# Patient Record
Sex: FEMALE | Race: WHITE | NOT HISPANIC OR LATINO | Employment: OTHER | URBAN - METROPOLITAN AREA
[De-identification: names, ages, dates, MRNs, and addresses within clinical notes are randomized per-mention and may not be internally consistent; named-entity substitution may affect disease eponyms.]

---

## 2018-05-15 ENCOUNTER — HOSPITAL ENCOUNTER (OUTPATIENT)
Dept: RADIOLOGY | Facility: MEDICAL CENTER | Age: 63
End: 2018-05-15

## 2018-05-15 ENCOUNTER — HOSPITAL ENCOUNTER (INPATIENT)
Facility: MEDICAL CENTER | Age: 63
LOS: 2 days | DRG: 419 | End: 2018-05-18
Attending: EMERGENCY MEDICINE | Admitting: INTERNAL MEDICINE
Payer: COMMERCIAL

## 2018-05-15 DIAGNOSIS — I10 ESSENTIAL HYPERTENSION, BENIGN: ICD-10-CM

## 2018-05-15 DIAGNOSIS — I31.39 PERICARDIAL EFFUSION: ICD-10-CM

## 2018-05-15 DIAGNOSIS — K80.51 CALCULUS OF BILE DUCT WITHOUT CHOLECYSTITIS WITH OBSTRUCTION: ICD-10-CM

## 2018-05-15 PROCEDURE — 99285 EMERGENCY DEPT VISIT HI MDM: CPT

## 2018-05-15 ASSESSMENT — PAIN SCALES - GENERAL: PAINLEVEL_OUTOF10: 4

## 2018-05-16 ENCOUNTER — APPOINTMENT (OUTPATIENT)
Dept: RADIOLOGY | Facility: MEDICAL CENTER | Age: 63
DRG: 419 | End: 2018-05-16
Attending: EMERGENCY MEDICINE
Payer: COMMERCIAL

## 2018-05-16 ENCOUNTER — HOSPITAL ENCOUNTER (OUTPATIENT)
Dept: RADIOLOGY | Facility: MEDICAL CENTER | Age: 63
End: 2018-05-16

## 2018-05-16 PROBLEM — J45.20 MILD INTERMITTENT ASTHMA: Status: ACTIVE | Noted: 2018-05-16

## 2018-05-16 PROBLEM — I10 ESSENTIAL HYPERTENSION: Status: ACTIVE | Noted: 2018-05-16

## 2018-05-16 PROBLEM — K80.51 CALCULUS OF BILE DUCT WITHOUT CHOLECYSTITIS WITH OBSTRUCTION: Status: ACTIVE | Noted: 2018-05-16

## 2018-05-16 LAB
ALBUMIN SERPL BCP-MCNC: 3.9 G/DL (ref 3.2–4.9)
ALBUMIN/GLOB SERPL: 1.3 G/DL
ALP SERPL-CCNC: 119 U/L (ref 30–99)
ALT SERPL-CCNC: 238 U/L (ref 2–50)
ANION GAP SERPL CALC-SCNC: 9 MMOL/L (ref 0–11.9)
AST SERPL-CCNC: 213 U/L (ref 12–45)
BASOPHILS # BLD AUTO: 0.8 % (ref 0–1.8)
BASOPHILS # BLD: 0.06 K/UL (ref 0–0.12)
BILIRUB SERPL-MCNC: 0.6 MG/DL (ref 0.1–1.5)
BUN SERPL-MCNC: 18 MG/DL (ref 8–22)
CALCIUM SERPL-MCNC: 9.5 MG/DL (ref 8.5–10.5)
CHLORIDE SERPL-SCNC: 108 MMOL/L (ref 96–112)
CO2 SERPL-SCNC: 23 MMOL/L (ref 20–33)
CREAT SERPL-MCNC: 0.66 MG/DL (ref 0.5–1.4)
EOSINOPHIL # BLD AUTO: 0.11 K/UL (ref 0–0.51)
EOSINOPHIL NFR BLD: 1.4 % (ref 0–6.9)
ERYTHROCYTE [DISTWIDTH] IN BLOOD BY AUTOMATED COUNT: 45.7 FL (ref 35.9–50)
GLOBULIN SER CALC-MCNC: 3 G/DL (ref 1.9–3.5)
GLUCOSE SERPL-MCNC: 102 MG/DL (ref 65–99)
HCT VFR BLD AUTO: 42 % (ref 37–47)
HGB BLD-MCNC: 13.9 G/DL (ref 12–16)
IMM GRANULOCYTES # BLD AUTO: 0.05 K/UL (ref 0–0.11)
IMM GRANULOCYTES NFR BLD AUTO: 0.6 % (ref 0–0.9)
LIPASE SERPL-CCNC: 23 U/L (ref 11–82)
LYMPHOCYTES # BLD AUTO: 2.14 K/UL (ref 1–4.8)
LYMPHOCYTES NFR BLD: 27.1 % (ref 22–41)
MCH RBC QN AUTO: 30.4 PG (ref 27–33)
MCHC RBC AUTO-ENTMCNC: 33.1 G/DL (ref 33.6–35)
MCV RBC AUTO: 91.9 FL (ref 81.4–97.8)
MONOCYTES # BLD AUTO: 0.52 K/UL (ref 0–0.85)
MONOCYTES NFR BLD AUTO: 6.6 % (ref 0–13.4)
NEUTROPHILS # BLD AUTO: 5.01 K/UL (ref 2–7.15)
NEUTROPHILS NFR BLD: 63.5 % (ref 44–72)
NRBC # BLD AUTO: 0 K/UL
NRBC BLD-RTO: 0 /100 WBC
PLATELET # BLD AUTO: 209 K/UL (ref 164–446)
PMV BLD AUTO: 11 FL (ref 9–12.9)
POTASSIUM SERPL-SCNC: 4 MMOL/L (ref 3.6–5.5)
PROT SERPL-MCNC: 6.9 G/DL (ref 6–8.2)
RBC # BLD AUTO: 4.57 M/UL (ref 4.2–5.4)
SODIUM SERPL-SCNC: 140 MMOL/L (ref 135–145)
WBC # BLD AUTO: 7.9 K/UL (ref 4.8–10.8)

## 2018-05-16 PROCEDURE — 99223 1ST HOSP IP/OBS HIGH 75: CPT | Performed by: INTERNAL MEDICINE

## 2018-05-16 PROCEDURE — 700105 HCHG RX REV CODE 258: Performed by: INTERNAL MEDICINE

## 2018-05-16 PROCEDURE — 85025 COMPLETE CBC W/AUTO DIFF WBC: CPT

## 2018-05-16 PROCEDURE — 74181 MRI ABDOMEN W/O CONTRAST: CPT

## 2018-05-16 PROCEDURE — 770006 HCHG ROOM/CARE - MED/SURG/GYN SEMI*

## 2018-05-16 PROCEDURE — 700111 HCHG RX REV CODE 636 W/ 250 OVERRIDE (IP): Performed by: EMERGENCY MEDICINE

## 2018-05-16 PROCEDURE — 83690 ASSAY OF LIPASE: CPT

## 2018-05-16 PROCEDURE — A9270 NON-COVERED ITEM OR SERVICE: HCPCS | Performed by: INTERNAL MEDICINE

## 2018-05-16 PROCEDURE — 80053 COMPREHEN METABOLIC PANEL: CPT

## 2018-05-16 PROCEDURE — 36415 COLL VENOUS BLD VENIPUNCTURE: CPT

## 2018-05-16 PROCEDURE — 700102 HCHG RX REV CODE 250 W/ 637 OVERRIDE(OP): Performed by: INTERNAL MEDICINE

## 2018-05-16 RX ORDER — PROMETHAZINE HYDROCHLORIDE 25 MG/1
12.5-25 TABLET ORAL EVERY 4 HOURS PRN
Status: DISCONTINUED | OUTPATIENT
Start: 2018-05-16 | End: 2018-05-18 | Stop reason: HOSPADM

## 2018-05-16 RX ORDER — OXYCODONE HYDROCHLORIDE 10 MG/1
10 TABLET ORAL
Status: DISCONTINUED | OUTPATIENT
Start: 2018-05-16 | End: 2018-05-18 | Stop reason: HOSPADM

## 2018-05-16 RX ORDER — LABETALOL HYDROCHLORIDE 5 MG/ML
10 INJECTION, SOLUTION INTRAVENOUS EVERY 4 HOURS PRN
Status: DISCONTINUED | OUTPATIENT
Start: 2018-05-16 | End: 2018-05-18 | Stop reason: HOSPADM

## 2018-05-16 RX ORDER — PROMETHAZINE HYDROCHLORIDE 25 MG/1
12.5-25 SUPPOSITORY RECTAL EVERY 4 HOURS PRN
Status: DISCONTINUED | OUTPATIENT
Start: 2018-05-16 | End: 2018-05-18 | Stop reason: HOSPADM

## 2018-05-16 RX ORDER — ONDANSETRON 2 MG/ML
4 INJECTION INTRAMUSCULAR; INTRAVENOUS EVERY 4 HOURS PRN
Status: DISCONTINUED | OUTPATIENT
Start: 2018-05-16 | End: 2018-05-18 | Stop reason: HOSPADM

## 2018-05-16 RX ORDER — ALBUTEROL SULFATE 90 UG/1
2 AEROSOL, METERED RESPIRATORY (INHALATION)
Status: DISCONTINUED | OUTPATIENT
Start: 2018-05-16 | End: 2018-05-18 | Stop reason: HOSPADM

## 2018-05-16 RX ORDER — OXYCODONE HYDROCHLORIDE 5 MG/1
5 TABLET ORAL
Status: DISCONTINUED | OUTPATIENT
Start: 2018-05-16 | End: 2018-05-18 | Stop reason: HOSPADM

## 2018-05-16 RX ORDER — LOSARTAN POTASSIUM 25 MG/1
25 TABLET ORAL
Status: DISCONTINUED | OUTPATIENT
Start: 2018-05-16 | End: 2018-05-18 | Stop reason: HOSPADM

## 2018-05-16 RX ORDER — ONDANSETRON 4 MG/1
4 TABLET, ORALLY DISINTEGRATING ORAL EVERY 4 HOURS PRN
Status: DISCONTINUED | OUTPATIENT
Start: 2018-05-16 | End: 2018-05-18 | Stop reason: HOSPADM

## 2018-05-16 RX ORDER — NAPROXEN SODIUM 220 MG
220 TABLET ORAL 2 TIMES DAILY PRN
COMMUNITY

## 2018-05-16 RX ORDER — MORPHINE SULFATE 4 MG/ML
4 INJECTION, SOLUTION INTRAMUSCULAR; INTRAVENOUS
Status: DISCONTINUED | OUTPATIENT
Start: 2018-05-16 | End: 2018-05-18 | Stop reason: HOSPADM

## 2018-05-16 RX ORDER — ACETAMINOPHEN 325 MG/1
650 TABLET ORAL EVERY 6 HOURS PRN
Status: DISCONTINUED | OUTPATIENT
Start: 2018-05-16 | End: 2018-05-18 | Stop reason: HOSPADM

## 2018-05-16 RX ORDER — SODIUM CHLORIDE 9 MG/ML
INJECTION, SOLUTION INTRAVENOUS CONTINUOUS
Status: DISCONTINUED | OUTPATIENT
Start: 2018-05-16 | End: 2018-05-18

## 2018-05-16 RX ADMIN — SODIUM CHLORIDE: 9 INJECTION, SOLUTION INTRAVENOUS at 19:35

## 2018-05-16 RX ADMIN — LOSARTAN POTASSIUM 25 MG: 25 TABLET, FILM COATED ORAL at 13:56

## 2018-05-16 RX ADMIN — SODIUM CHLORIDE: 9 INJECTION, SOLUTION INTRAVENOUS at 05:02

## 2018-05-16 RX ADMIN — FENTANYL CITRATE 50 MCG: 50 INJECTION, SOLUTION INTRAMUSCULAR; INTRAVENOUS at 03:52

## 2018-05-16 ASSESSMENT — LIFESTYLE VARIABLES
HAVE PEOPLE ANNOYED YOU BY CRITICIZING YOUR DRINKING: NO
HAVE YOU EVER FELT YOU SHOULD CUT DOWN ON YOUR DRINKING: NO
ALCOHOL_USE: YES
AVERAGE NUMBER OF DAYS PER WEEK YOU HAVE A DRINK CONTAINING ALCOHOL: 7
ON A TYPICAL DAY WHEN YOU DRINK ALCOHOL HOW MANY DRINKS DO YOU HAVE: 1
TOTAL SCORE: 0
HOW MANY TIMES IN THE PAST YEAR HAVE YOU HAD 5 OR MORE DRINKS IN A DAY: 0
EVER FELT BAD OR GUILTY ABOUT YOUR DRINKING: NO
EVER_SMOKED: NEVER
TOTAL SCORE: 0
CONSUMPTION TOTAL: NEGATIVE
TOTAL SCORE: 0
EVER HAD A DRINK FIRST THING IN THE MORNING TO STEADY YOUR NERVES TO GET RID OF A HANGOVER: NO
EVER_SMOKED: NEVER

## 2018-05-16 ASSESSMENT — ENCOUNTER SYMPTOMS
SPUTUM PRODUCTION: 0
VOMITING: 0
BLURRED VISION: 0
MYALGIAS: 0
HEADACHES: 0
NAUSEA: 1
SEIZURES: 0
BLOOD IN STOOL: 0
CHILLS: 0
DIZZINESS: 0
DIARRHEA: 0
BRUISES/BLEEDS EASILY: 0
COUGH: 0
FLANK PAIN: 0
FOCAL WEAKNESS: 0
SHORTNESS OF BREATH: 0
DIAPHORESIS: 0
NECK PAIN: 0
BACK PAIN: 0
ABDOMINAL PAIN: 1
FEVER: 0
SORE THROAT: 0
PALPITATIONS: 0
WHEEZING: 0

## 2018-05-16 ASSESSMENT — PAIN SCALES - GENERAL
PAINLEVEL_OUTOF10: 0
PAINLEVEL_OUTOF10: 0
PAINLEVEL_OUTOF10: 1
PAINLEVEL_OUTOF10: 0
PAINLEVEL_OUTOF10: 0

## 2018-05-16 ASSESSMENT — PATIENT HEALTH QUESTIONNAIRE - PHQ9
SUM OF ALL RESPONSES TO PHQ9 QUESTIONS 1 AND 2: 0
1. LITTLE INTEREST OR PLEASURE IN DOING THINGS: NOT AT ALL
2. FEELING DOWN, DEPRESSED, IRRITABLE, OR HOPELESS: NOT AT ALL

## 2018-05-16 ASSESSMENT — COPD QUESTIONNAIRES
IN THE PAST 12 MONTHS DO YOU DO LESS THAN YOU USED TO BECAUSE OF YOUR BREATHING PROBLEMS: DISAGREE/UNSURE
HAVE YOU SMOKED AT LEAST 100 CIGARETTES IN YOUR ENTIRE LIFE: NO/DON'T KNOW
DO YOU EVER COUGH UP ANY MUCUS OR PHLEGM?: NO/ONLY WITH OCCASIONAL COLDS OR INFECTIONS
DURING THE PAST 4 WEEKS HOW MUCH DID YOU FEEL SHORT OF BREATH: NONE/LITTLE OF THE TIME
DO YOU EVER COUGH UP ANY MUCUS OR PHLEGM?: NO/ONLY WITH OCCASIONAL COLDS OR INFECTIONS
COPD SCREENING SCORE: 2
COPD SCREENING SCORE: 2
HAVE YOU SMOKED AT LEAST 100 CIGARETTES IN YOUR ENTIRE LIFE: NO/DON'T KNOW
DURING THE PAST 4 WEEKS HOW MUCH DID YOU FEEL SHORT OF BREATH: NONE/LITTLE OF THE TIME

## 2018-05-16 NOTE — PROGRESS NOTES
Med rec complete per pt at bedside  Allergies reviewed - NKDA  No ABX in last month  Pt denies taking any prescription medications

## 2018-05-16 NOTE — CARE PLAN
Problem: Communication  Goal: The ability to communicate needs accurately and effectively will improve    Intervention: Educate patient and significant other/support system about the plan of care, procedures, treatments, medications and allow for questions  Pending GI consult, patient to remain NPO for possible surgery, patient and significant other aware with plan of care.       Problem: Pain Management  Goal: Pain level will decrease to patient's comfort goal    Intervention: Educate and implement non-pharmacologic comfort measures. Examples: relaxation, distration, play therapy, activity therapy, massage, etc.  Patient denies any abdominal pain or tenderness upon palpation, bowel sounds normoactive, patient up self and tolerating ambulation well with no complaints.

## 2018-05-16 NOTE — ED NOTES
Pt medicated for pain, O2 applied 2 L NC secondary to med admin. R IV would not draw or flush, L IV infiltrated, both removed, new line inserted.

## 2018-05-16 NOTE — ED PROVIDER NOTES
"ED Provider Note    Scribed for Janny Trevizo M.D. by Karen Hernandez. 5/16/2018, 12:27 AM.    Means of arrival: walk in   History obtained from: Patient  History limited by: none     CHIEF COMPLAINT  Chief Complaint   Patient presents with   • Flank Pain     R side   • Gallstones     transfer from [facility] in [place], [place].        SARIAH Ahmadi is a 63 y.o. female with a history of hypertension, asthma who presents to the Emergency Department via EMS as a transfer form [facility] for evaluation of severe right sided flank pain with associated epigastric components onset today. Patient reports associated nausea. Her pain began in her upper mid back and radiated to her right flank. Patient was given pain medication at the transferring facility and reports her pain has improved at this time. No complaints of vomiting.     Review of transfer medical records from [facility] shows the patient had elevated LFT's, ALT of 369, AST of 447, alkaline phosphate of 167, total bilirubin of 0.9, a normal lipase, US of right upper quadrant indicated 3 mm CBD stones and the CBD is dilated to 9 mm without pericholecystic fluid. Patient was transferred here for a GI consult.     REVIEW OF SYSTEMS  Positives as above. Pertinent negatives include vomiting.   All other review of systems are negative.   C    PAST MEDICAL HISTORY    hypertension, asthma    SURGICAL HISTORY  patient denies any surgical history    SOCIAL HISTORY  Patient is a transfer from [facility]     FAMILY HISTORY  No family history noted    CURRENT MEDICATIONS  Reviewed. See Encounter Summary.     ALLERGIES  No Known Allergies    PHYSICAL EXAM  VITAL SIGNS: /122   Pulse 94   Temp 36.7 °C (98.1 °F) (Temporal)   Resp 16   Ht 1.702 m (5' 7\")   Wt 93.1 kg (205 lb 4 oz)   SpO2 97%   BMI 32.15 kg/m²    Pulse ox interpretation: I interpret this pulse ox as normal.  Constitutional: Alert in no apparent " distress.  HENT: Normocephalic atraumatic, MMM  Eyes: PERR, Conjunctiva normal, Non-icteric.   Neck: Normal range of motion, No tenderness, Supple, No stridor.   Lymphatic: No lymphadenopathy noted.   Cardiovascular: Regular rate and rhythm, no murmurs.   Thorax & Lungs: Normal breath sounds, No respiratory distress, No wheezing, No chest tenderness.   Abdomen: Bowel sounds normal, Soft, No tenderness, No pulsatile masses. No peritoneal signs.  Skin: Warm, Dry, No erythema, No rash.   Back: No bony tenderness, No CVA tenderness.   Extremities: Intact distal pulses, No edema, No tenderness, No cyanosis  Musculoskeletal: Good range of motion in all major joints. No tenderness to palpation or major deformities noted.   Neurologic: Alert and oriented, No focal deficits noted.       DIFFERENTIAL DIAGNOSIS AND WORK UP PLAN    12:27 AM Patient seen and examined at bedside. The patient presents with right sided flank pain and gallstones and the differential diagnosis includes but is not limited to low concern for ACS. Ordered for MR abdomen, CBC, CMP, lipase to evaluate.     DIAGNOSTIC STUDIES / PROCEDURES     LABS  CBC within normal limits, CMP with ALT AST and alk phos elevated with a normal T bili and also improved from prior lipase normal  All labs were reviewed by me.    RADIOLOGY  [id number]   Final Result      1.  No gallstone demonstrated.   2.  No biliary dilation or evidence for common bile duct stone.      OUTSIDE IMAGES-US ABDOMEN   Final Result      OUTSIDE IMAGES-CT ABDOMEN /PELVIS   Final Result        The radiologist's interpretation of all radiological studies have been reviewed by me.    COURSE & MEDICAL DECISION MAKING  Pertinent Labs & Imaging studies reviewed. (See chart for details)    1:16 AM- Discussed case with Dr. Santana (GI) who will follow the patient agrees with my plan of care.  Dr. Santana requested that all non-emergent consults should be called in the morning and not in the middle of the  night, especially if there are no questions.     1:31 AM D/W with Dr. Hodge (hospitalist) regarding the patient.  He accepts the patient for admission.      3:49 AM- 3:49 AM - I spoke with the nurse who informed me the patient continues to experience pain and is requesting more pain medication. She will be given Sublimaze 50 mcg.     This is a 63 y.o. year old female who presents with concern for CBD dilatation and stone stuck in the ducts without an elevated T bili, will perform MRCP for GI and that the patient in the hospital      DISPOSITION:  Patient will be admitted to Dr. Hodge (hospitalist) in guarded condition.    FINAL IMPRESSION  1. Symptomatic cholelithiasis  2. Elevated lft's  3. Epigastric pain  4. Nausea and vomiting, not intractable      IKaren (Scribe), am scribing for, and in the presence of, Janny Trevizo M.D..    Electronically signed by: Karen Hernandez (Scribe), 5/16/2018    IJanny M.D. personally performed the services described in this documentation, as scribed by Karen Hernandez in my presence, and it is both accurate and complete.    The note accurately reflects work and decisions made by me.  Janny Trevizo  5/16/2018  5:06 AM    This dictation has been created using voice recognition software and/or scribes. The accuracy of the dictation is limited by the abilities of the software and the expertise of the scribes. I expect there may be some errors of grammar and possibly content. I made every attempt to manually correct the errors within my dictation. However, errors related to voice recognition software and/or scribes may still exist and should be interpreted within the appropriate context.

## 2018-05-16 NOTE — ASSESSMENT & PLAN NOTE
MRCP negative for gallstone  Status post laparoscopic cholecystectomy secondary to acute cholecystitis, postop day #0    GI , no indication for ERCP secondary to a negative MRCP    No evidence of infection at this time  Continue to monitor CMP  Pain control with oxycodone and IV morphine

## 2018-05-16 NOTE — PROGRESS NOTES
Admitted this a.m. from [facility] with common bile duct stone and 9 mm CBD dilatation. GI has been consulted. Follow-up recommendations.  MRCP is negative for stone or CBD dilatation. Continue IV fluid and pain management.

## 2018-05-16 NOTE — ASSESSMENT & PLAN NOTE
controlled  I will start the patient on losartan 25 mg daily  IV labetalol when necessary for SBP greater than 160

## 2018-05-16 NOTE — PROGRESS NOTES
Pt arrived to unit at 0530 via Pt transport with  at the bedside. Oriented to unit, routine, and use of call light system.

## 2018-05-16 NOTE — H&P
Hospital Medicine History and Physical    Date of Service  5/16/2018    Chief Complaint  Chief Complaint   Patient presents with   • Flank Pain     R side   • Gallstones     transfer from [facility] in [place], [place].        History of Presenting Illness  63 y.o. female who presented 5/15/2018 with right sided abdominal pain started earlier today. The patient reports right upper quadrant abdominal pain with radiation to the back, associated with nausea but no vomiting. Pain is rated at 8/10 in intensity. There are no relieving or exacerbating factors. She denies any fevers, chest pain, shortness of breath, diarrhea or dysuria. She presented to [facility] facility where she was found to have elevated LFTs with ALT of 369, , alk phos 167, total bili 0.9, normal lipase. Ultrasound abdomen revealed 3 mm CBD stone and CBD dilatation of 9 mm without pericholecystic fluid. At this time the patient's vitals appear to be stable. GI was consulted by the ER physician.      Primary Care Physician  Pcp Pt States None    Consultants  GI Dr Dawkins    Code Status  Full Code    Review of Systems  Review of Systems   Constitutional: Negative for chills, diaphoresis and fever.   HENT: Negative for hearing loss and sore throat.    Eyes: Negative for blurred vision.   Respiratory: Negative for cough, sputum production, shortness of breath and wheezing.    Cardiovascular: Negative for chest pain, palpitations and leg swelling.   Gastrointestinal: Positive for abdominal pain and nausea. Negative for blood in stool, diarrhea and vomiting.   Genitourinary: Negative for dysuria, flank pain and urgency.   Musculoskeletal: Negative for back pain, joint pain, myalgias and neck pain.   Skin: Negative for rash.   Neurological: Negative for dizziness, focal weakness, seizures and headaches.   Endo/Heme/Allergies: Does not bruise/bleed easily.   Psychiatric/Behavioral: Negative for suicidal ideas.   All other systems reviewed and are  negative.       Past Medical History  Past Medical History:   Diagnosis Date   • Kidney calculi    • Asthma    • Hypertension        Surgical History  Lithotripsy  Colonoscopy    Medications  No current facility-administered medications on file prior to encounter.      No current outpatient prescriptions on file prior to encounter.   Not on any medications    Family History  History reviewed. No pertinent family history.    Social History  Social History   Substance Use Topics   • Smoking status: Never Smoker   • Smokeless tobacco: Never Used   • Alcohol use Yes      Comment: x1 galss wine daily       Allergies  No Known Allergies     Physical Exam  Laboratory   Hemodynamics  Temp (24hrs), Av.7 °C (98.1 °F), Min:36.7 °C (98.1 °F), Max:36.7 °C (98.1 °F)   Temperature: 36.7 °C (98.1 °F)  Pulse  Av.7  Min: 75  Max: 94    Blood Pressure: 146/122, NIBP: 144/74      Respiratory      Respiration: 16, Pulse Oximetry: 94 %             Physical Exam   Constitutional: She is oriented to person, place, and time. She appears well-developed and well-nourished. No distress.   HENT:   Head: Normocephalic and atraumatic.   Mouth/Throat: Oropharynx is clear and moist.   Eyes: Conjunctivae are normal. Pupils are equal, round, and reactive to light.   Neck: Neck supple.   Cardiovascular: Normal rate, regular rhythm and normal heart sounds.    Pulmonary/Chest: Effort normal and breath sounds normal. No respiratory distress. She has no wheezes. She has no rales.   Abdominal: Soft. Bowel sounds are normal. She exhibits no distension. There is tenderness (right upper quadrant). There is no rebound and no guarding.   Musculoskeletal: Normal range of motion. She exhibits no edema or tenderness.   Neurological: She is alert and oriented to person, place, and time. No cranial nerve deficit. Coordination normal.   Skin: Skin is warm.   Psychiatric: She has a normal mood and affect. Her behavior is normal.   Nursing note and vitals  reviewed.      Recent Labs      05/16/18   0119   WBC  7.9   RBC  4.57   HEMOGLOBIN  13.9   HEMATOCRIT  42.0   MCV  91.9   MCH  30.4   MCHC  33.1*   RDW  45.7   PLATELETCT  209   MPV  11.0     Recent Labs      05/16/18   0119   SODIUM  140   POTASSIUM  4.0   CHLORIDE  108   CO2  23   GLUCOSE  102*   BUN  18   CREATININE  0.66   CALCIUM  9.5     Recent Labs      05/16/18   0119   ALTSGPT  238*   ASTSGOT  213*   ALKPHOSPHAT  119*   TBILIRUBIN  0.6   LIPASE  23   GLUCOSE  102*                 No results found for: TROPONINI  Urinalysis:  No results found for: SPECGRAVITY, GLUCOSEUR, KETONES, NITRITE, WBCURINE, RBCURINE, BACTERIA, EPITHELCELL     Imaging  [id number]-H/O   Final Result      1.  No gallstone demonstrated.   2.  No biliary dilation or evidence for common bile duct stone.      OUTSIDE IMAGES-US ABDOMEN   Final Result      OUTSIDE IMAGES-CT ABDOMEN /PELVIS   Final Result           Assessment/Plan     I anticipate this patient will require at least two midnights for appropriate medical management, necessitating inpatient admission.    Calculus of bile duct without cholecystitis with obstruction- (present on admission)   Assessment & Plan    MRCP ordered  Nothing by mouth  GI has been consulted for possible ERCP  Will consult surgery for cholecystectomy after ERCP  No evidence of infection at this time  Continue to monitor CMP  Pain control with oxycodone and IV morphine          Essential hypertension- (present on admission)   Assessment & Plan    Uncontrolled  I will start the patient on losartan 25 mg daily  IV labetalol when necessary for SBP greater than 160        Mild intermittent asthma- (present on admission)   Assessment & Plan    Stable  Albuterol when necessary            VTE prophylaxis: SCD.

## 2018-05-16 NOTE — PROGRESS NOTES
Two RN skin check done with Guillermina PARISI. Skin is unremarkable with the exception of a bandaid to the left hip where she received a pain shot. No signs of skin breakdown noted.

## 2018-05-16 NOTE — CONSULTS
[facility]    DATE OF SERVICE:  05/16/2018    TIME:  8:53 a.m.    REFERRING PHYSICIAN:  Dr. Nav Hodge.    PRIMARY CARE PHYSICIAN:  Unestablblake.  She goes to the urgent care in   [place], [place].    REASON FOR CONSULTATION:  Abnormal liver tests, right upper quadrant abdominal   pain.    HISTORY OF PRESENT ILLNESS:  A 63-year-old  female with obesity and   family history of gallstones, presents with right upper quadrant abdominal   pain and abnormal liver tests.  She was transferred from [facility]   in [place], [place], where she presented with right upper quadrant   abdominal pain radiating to her back.  She had acute onset of these pains   prior to admission, colicky in nature, up to 8/10 in severity, associated with   nausea and vomiting.  She was found to have abnormal liver tests with an AST   of 447, ALT of 369, and total bilirubin of 0.9, alkaline phosphatase of 167   with a normal lipase.  Ultrasound at the outside facility showed a possible 3   mm common biliary duct stone with common biliary ductal dilatation to 9 mm   without any evidence of pericholecystic fluid.  Patient was transferred to   [facility] for higher level of care.  After transfer here, she noted   resolution of abdominal pain symptoms.  Her LFTs have trended downward with a   total bilirubin of 0.6, AST of 213, ALT of 238, and alkaline phosphatase of   119.  MRCP revealed no evidence of biliary ductal dilatation with a 4 mm   common biliary duct.  There was no signal void to indicate a stone.  There is   no evidence of choledocholithiasis.  Her symptoms have since improved and   abdominal pain symptoms have essentially resolved, but she did receive one   dose of pain medications with oxycodone.  Last night, her nausea and vomiting   also resolved.  She denied any jaundice, dakota colored stools, pruritus,   melena, hematochezia, or weight loss.  She denies further modifying factors,   associated symptoms,  or timing issues with her complaints.    PAST MEDICAL HISTORY:  1.  History of colon polyps approximately 5 years ago, had a colonoscopy in   [place] by a surgeon that reportedly showed small colon polyps, which were   removed.  2.  Obesity, BMI of 32.  3.  Asthma.  4.  Hypertension.  5.  History of kidney stones in 2012.    PAST SURGICAL HISTORY:  Lithotripsy.    ALLERGIES:  No known drug allergies.    OUTPATIENT MEDICATIONS:  None.    SOCIAL HISTORY:  Retired, previously worked as a .  Denied tobacco,   illicit drug use, or tattoos.  Admits to drinking 1 glass of wine daily.    FAMILY HISTORY:  Mother had gallstones and peptic ulcer disease.  Denied   family history of colorectal cancer, gastric cancer, pancreatic cancer.    REVIEW OF SYSTEMS:  As per HPI, past medical history, past surgical history   sections, otherwise greater than 10 systems negative including constitutional,   head, ears, eyes, nose, throat, pulmonary, cardiovascular, GI, genital,   rectal, hematological, rheumatological, psychiatric, neurological,   dermatological, hematological/oncological, musculoskeletal female.    PHYSICAL EXAMINATION:  VITAL SIGNS:  Temperature 97.5, respirations 16, pulse 70-94, blood pressure   145/85, height 67 inches, weight 206 pounds.  GENERAL:  Well-developed, well-nourished white female, in no apparent   distress, alert and oriented.  HEENT:  Head is atraumatic, normocephalic.  Eyes, extraocular movements   intact, nonicteric sclerae.  Nose, no erythema, no discharge.  Mouth, no   erythema, no discharge, no thrush noted.  Mallampati of 2.  NECK:  Supple.  No lymphadenopathy or JVD.  PULMONARY:  Clear to auscultation bilaterally.  CARDIOVASCULAR:  Regular rate and rhythm without murmur, rub, or gallop.  ABDOMEN:  Nondistended, tender in the right upper quadrant, but no rebound.    No appreciable hepatosplenomegaly, ascites, ecchymosis.  DERMATOLOGIC:  No spider angiomas, no palmar  erythema.  NEUROLOGICAL:  No focal deficits appreciated.  No asterixis.  MUSCULOSKELETAL:  Moving all extremities.  Strength 5/5 throughout.  PSYCHIATRIC:  Appropriate mood, affect, interaction, and insight.    LABORATORY DATA:  Normal CBC with WBC count of 7.9, hemoglobin 13.9,   hematocrit 42, platelet count 209.  Normal BMP except for glucose of 102.  GFR   greater than 60, calcium 9.5, , , alkaline phosphatase 119,   total bilirubin 0.6, albumin 3.9, lipase normal at 23.    MRCP, 05/16/2018, as per HPI, no evidence of choledocholithiasis.    EKG from 10/31/2012, probable left atrial abnormality.    IMPRESSION:  A 63-year-old  female with intact gallbladder, presents   with down trending abnormal liver tests, mostly transaminitis, now with   negative MRCP showing no evidence of choledocholithiasis.  Her history is most   consistent with passed choledocholithiasis with relieved obstruction as well   as acute cholecystitis.    Comorbidities include asthma, hypertension, BMI of 31, regular alcohol intake,   daily wine usage, and acute cholecystitis with mildly elevated blood sugar or   impaired glucose tolerance.    PROBLEMS:  1.  Abnormal liver tests, down trending, consistent with passed   choledocholithiasis.  2.  Acute cholecystitis.  3.  Right upper quadrant abdominal pain.  4.  History of colon polyps.  5.  Body mass index of 32.  6.  Daily wine intake.    RECOMMENDATIONS:  1.  No indication for ERCP now that MRCP is negative and LFTs are down   trending with improvement of symptoms.  2.  Recommend that hospitalist consider antibiotic coverage such as Zosyn for   acute cholecystitis.  3.  Agree with surgical referral for laparoscopic cholecystectomy.  4.  GI consultant signed off as her GI issues have been addressed and there is   no indication or need for ERCP due to a negative MRCP and down-trending LFTs   with improvement of abdominal pain symptoms.  5.  Outpatient colonoscopy with  propofol for surveillance of colon polyps.  I   have instructed my office to call patient to schedule in the 3-6 months.    Propofol medical necessity due to daily wine intake and obesity, BMI of 32.    Dear Dr. Nav Hodge and Dr. Jeni Holguin:    Thank you for asking me to evaluate this patient in consultation.  Please   refer my consult note as per above for details of recommendations.  Please   feel free to call us with any questions.       ____________________________________     MD SERGEY KHALIL / LESLIE    DD:  05/16/2018 09:03:41  DT:  05/16/2018 12:46:26    D#:  [id number]  Job#:  [id number]    cc: Nav PANDA DO, MD

## 2018-05-16 NOTE — ED TRIAGE NOTES
"Shola Ahmadi  Chief Complaint   Patient presents with   • Flank Pain     R side   • Gallstones     transfer from [facility] in [place], [place].      Pt ambulatory to triage with above complaint. Pt was seen in [place] for sudden onset of R sided flank pain. Pt has hx of nephrolithiasis. Labs done pta were resulted at AST of 447 and ALT of 369. Alk phos is elevated at 169. An US was done and revealed a 3mm CBD stone and a CBD dilated to 9mm. Pt was transferred to [facility] for further evaluation, GI consult, and surgical services.    /122   Pulse 94   Temp 36.7 °C (98.1 °F) (Temporal)   Resp 16   Ht 1.702 m (5' 7\")   Wt 93.1 kg (205 lb 4 oz)   SpO2 97%   BMI 32.15 kg/m²     Pt informed of triage process and encouraged to notify staff of any changes or concerns. Pt verbalized understanding of instructions. Apologized for long wait time. Pt placed back in lobby.     "

## 2018-05-16 NOTE — CARE PLAN
Problem: Communication  Goal: The ability to communicate needs accurately and effectively will improve    Intervention: [place] patient and significant other/support system to call light to alert staff of needs  Pt oriented to unit, routine, and use of call light. Pt calls appropriately.       Problem: Pain Management  Goal: Pain level will decrease to patient's comfort goal    Intervention: Follow pain managment plan developed in collaboration with patient and Interdisciplinary Team  Pt declines pain medication at this time and rates her pain at 1/10.

## 2018-05-17 PROBLEM — K59.00 CONSTIPATION: Status: ACTIVE | Noted: 2018-05-17

## 2018-05-17 LAB
ALBUMIN SERPL BCP-MCNC: 3.6 G/DL (ref 3.2–4.9)
ALBUMIN/GLOB SERPL: 1.5 G/DL
ALP SERPL-CCNC: 102 U/L (ref 30–99)
ALT SERPL-CCNC: 133 U/L (ref 2–50)
ANION GAP SERPL CALC-SCNC: 5 MMOL/L (ref 0–11.9)
AST SERPL-CCNC: 63 U/L (ref 12–45)
BASOPHILS # BLD AUTO: 0.9 % (ref 0–1.8)
BASOPHILS # BLD: 0.06 K/UL (ref 0–0.12)
BILIRUB SERPL-MCNC: 0.5 MG/DL (ref 0.1–1.5)
BUN SERPL-MCNC: 14 MG/DL (ref 8–22)
CALCIUM SERPL-MCNC: 8.9 MG/DL (ref 8.5–10.5)
CHLORIDE SERPL-SCNC: 111 MMOL/L (ref 96–112)
CO2 SERPL-SCNC: 23 MMOL/L (ref 20–33)
CREAT SERPL-MCNC: 0.54 MG/DL (ref 0.5–1.4)
EOSINOPHIL # BLD AUTO: 0.28 K/UL (ref 0–0.51)
EOSINOPHIL NFR BLD: 4.1 % (ref 0–6.9)
ERYTHROCYTE [DISTWIDTH] IN BLOOD BY AUTOMATED COUNT: 46 FL (ref 35.9–50)
GLOBULIN SER CALC-MCNC: 2.4 G/DL (ref 1.9–3.5)
GLUCOSE SERPL-MCNC: 93 MG/DL (ref 65–99)
HCT VFR BLD AUTO: 41.4 % (ref 37–47)
HGB BLD-MCNC: 14 G/DL (ref 12–16)
IMM GRANULOCYTES # BLD AUTO: 0.05 K/UL (ref 0–0.11)
IMM GRANULOCYTES NFR BLD AUTO: 0.7 % (ref 0–0.9)
LYMPHOCYTES # BLD AUTO: 2.4 K/UL (ref 1–4.8)
LYMPHOCYTES NFR BLD: 34.9 % (ref 22–41)
MCH RBC QN AUTO: 31.3 PG (ref 27–33)
MCHC RBC AUTO-ENTMCNC: 33.8 G/DL (ref 33.6–35)
MCV RBC AUTO: 92.6 FL (ref 81.4–97.8)
MONOCYTES # BLD AUTO: 0.45 K/UL (ref 0–0.85)
MONOCYTES NFR BLD AUTO: 6.6 % (ref 0–13.4)
NEUTROPHILS # BLD AUTO: 3.63 K/UL (ref 2–7.15)
NEUTROPHILS NFR BLD: 52.8 % (ref 44–72)
NRBC # BLD AUTO: 0 K/UL
NRBC BLD-RTO: 0 /100 WBC
PLATELET # BLD AUTO: 186 K/UL (ref 164–446)
PMV BLD AUTO: 10.7 FL (ref 9–12.9)
POTASSIUM SERPL-SCNC: 3.8 MMOL/L (ref 3.6–5.5)
PROT SERPL-MCNC: 6 G/DL (ref 6–8.2)
RBC # BLD AUTO: 4.47 M/UL (ref 4.2–5.4)
SODIUM SERPL-SCNC: 139 MMOL/L (ref 135–145)
WBC # BLD AUTO: 6.9 K/UL (ref 4.8–10.8)

## 2018-05-17 PROCEDURE — 160028 HCHG SURGERY MINUTES - 1ST 30 MINS LEVEL 3: Performed by: SURGERY

## 2018-05-17 PROCEDURE — 700102 HCHG RX REV CODE 250 W/ 637 OVERRIDE(OP): Performed by: INTERNAL MEDICINE

## 2018-05-17 PROCEDURE — 502571 HCHG PACK, LAP CHOLE: Performed by: SURGERY

## 2018-05-17 PROCEDURE — 501571 HCHG TROCAR, SEPARATOR 12X100: Performed by: SURGERY

## 2018-05-17 PROCEDURE — 87177 OVA AND PARASITES SMEARS: CPT

## 2018-05-17 PROCEDURE — 160036 HCHG PACU - EA ADDL 30 MINS PHASE I: Performed by: SURGERY

## 2018-05-17 PROCEDURE — 99233 SBSQ HOSP IP/OBS HIGH 50: CPT | Performed by: INTERNAL MEDICINE

## 2018-05-17 PROCEDURE — A9270 NON-COVERED ITEM OR SERVICE: HCPCS

## 2018-05-17 PROCEDURE — 700111 HCHG RX REV CODE 636 W/ 250 OVERRIDE (IP)

## 2018-05-17 PROCEDURE — 501568 HCHG TROCAR, BLUNTPORT 12MM: Performed by: SURGERY

## 2018-05-17 PROCEDURE — 36415 COLL VENOUS BLD VENIPUNCTURE: CPT

## 2018-05-17 PROCEDURE — 501338 HCHG SHEARS, ENDO: Performed by: SURGERY

## 2018-05-17 PROCEDURE — 0FT44ZZ RESECTION OF GALLBLADDER, PERCUTANEOUS ENDOSCOPIC APPROACH: ICD-10-PCS | Performed by: SURGERY

## 2018-05-17 PROCEDURE — 501838 HCHG SUTURE GENERAL: Performed by: SURGERY

## 2018-05-17 PROCEDURE — 501572 HCHG TROCAR, SHIELD OBTU 5X100: Performed by: SURGERY

## 2018-05-17 PROCEDURE — 160048 HCHG OR STATISTICAL LEVEL 1-5: Performed by: SURGERY

## 2018-05-17 PROCEDURE — 160039 HCHG SURGERY MINUTES - EA ADDL 1 MIN LEVEL 3: Performed by: SURGERY

## 2018-05-17 PROCEDURE — 80053 COMPREHEN METABOLIC PANEL: CPT

## 2018-05-17 PROCEDURE — A9270 NON-COVERED ITEM OR SERVICE: HCPCS | Performed by: INTERNAL MEDICINE

## 2018-05-17 PROCEDURE — 160002 HCHG RECOVERY MINUTES (STAT): Performed by: SURGERY

## 2018-05-17 PROCEDURE — 160009 HCHG ANES TIME/MIN: Performed by: SURGERY

## 2018-05-17 PROCEDURE — 160035 HCHG PACU - 1ST 60 MINS PHASE I: Performed by: SURGERY

## 2018-05-17 PROCEDURE — 700102 HCHG RX REV CODE 250 W/ 637 OVERRIDE(OP)

## 2018-05-17 PROCEDURE — 500002 HCHG ADHESIVE, DERMABOND: Performed by: SURGERY

## 2018-05-17 PROCEDURE — 501399 HCHG SPECIMAN BAG, ENDO CATC: Performed by: SURGERY

## 2018-05-17 PROCEDURE — 501583 HCHG TROCAR, THRD CAN&SEAL 5X100: Performed by: SURGERY

## 2018-05-17 PROCEDURE — 500697 HCHG HEMOCLIP, LARGE (ORANGE): Performed by: SURGERY

## 2018-05-17 PROCEDURE — 770001 HCHG ROOM/CARE - MED/SURG/GYN PRIV*

## 2018-05-17 PROCEDURE — 88304 TISSUE EXAM BY PATHOLOGIST: CPT

## 2018-05-17 PROCEDURE — 85025 COMPLETE CBC W/AUTO DIFF WBC: CPT

## 2018-05-17 PROCEDURE — 700101 HCHG RX REV CODE 250

## 2018-05-17 PROCEDURE — 700105 HCHG RX REV CODE 258: Performed by: INTERNAL MEDICINE

## 2018-05-17 RX ORDER — HALOPERIDOL 5 MG/ML
INJECTION INTRAMUSCULAR
Status: COMPLETED
Start: 2018-05-17 | End: 2018-05-17

## 2018-05-17 RX ORDER — POLYETHYLENE GLYCOL 3350 17 G/17G
1 POWDER, FOR SOLUTION ORAL
Status: DISCONTINUED | OUTPATIENT
Start: 2018-05-17 | End: 2018-05-18 | Stop reason: HOSPADM

## 2018-05-17 RX ORDER — BUPIVACAINE HYDROCHLORIDE AND EPINEPHRINE 5; 5 MG/ML; UG/ML
INJECTION, SOLUTION EPIDURAL; INTRACAUDAL; PERINEURAL
Status: DISCONTINUED | OUTPATIENT
Start: 2018-05-17 | End: 2018-05-17 | Stop reason: HOSPADM

## 2018-05-17 RX ORDER — OXYCODONE HCL 5 MG/5 ML
SOLUTION, ORAL ORAL
Status: COMPLETED
Start: 2018-05-17 | End: 2018-05-17

## 2018-05-17 RX ORDER — AMOXICILLIN 250 MG
2 CAPSULE ORAL 2 TIMES DAILY
Status: DISCONTINUED | OUTPATIENT
Start: 2018-05-17 | End: 2018-05-18 | Stop reason: HOSPADM

## 2018-05-17 RX ORDER — BISACODYL 10 MG
10 SUPPOSITORY, RECTAL RECTAL
Status: DISCONTINUED | OUTPATIENT
Start: 2018-05-17 | End: 2018-05-18 | Stop reason: HOSPADM

## 2018-05-17 RX ADMIN — SODIUM CHLORIDE: 9 INJECTION, SOLUTION INTRAVENOUS at 05:51

## 2018-05-17 RX ADMIN — OXYCODONE HYDROCHLORIDE 10 MG: 5 SOLUTION ORAL at 11:00

## 2018-05-17 RX ADMIN — HALOPERIDOL LACTATE 1 MG: 5 INJECTION, SOLUTION INTRAMUSCULAR at 11:00

## 2018-05-17 RX ADMIN — STANDARDIZED SENNA CONCENTRATE AND DOCUSATE SODIUM 2 TABLET: 8.6; 5 TABLET, FILM COATED ORAL at 21:57

## 2018-05-17 RX ADMIN — OXYCODONE HYDROCHLORIDE 10 MG: 10 TABLET ORAL at 16:41

## 2018-05-17 RX ADMIN — POLYETHYLENE GLYCOL 3350 1 PACKET: 17 POWDER, FOR SOLUTION ORAL at 21:57

## 2018-05-17 ASSESSMENT — ENCOUNTER SYMPTOMS
DIZZINESS: 0
BACK PAIN: 0
NAUSEA: 0
FOCAL WEAKNESS: 0
COUGH: 0
NERVOUS/ANXIOUS: 0
HEADACHES: 0
SENSORY CHANGE: 0
SHORTNESS OF BREATH: 0
ABDOMINAL PAIN: 1
CONSTIPATION: 1
VOMITING: 0
FEVER: 0
MEMORY LOSS: 0
DIAPHORESIS: 0
SPEECH CHANGE: 0
DEPRESSION: 0
PALPITATIONS: 0
CHILLS: 0
MYALGIAS: 0
FLANK PAIN: 0
WEAKNESS: 1

## 2018-05-17 ASSESSMENT — PAIN SCALES - GENERAL
PAINLEVEL_OUTOF10: 3
PAINLEVEL_OUTOF10: 0
PAINLEVEL_OUTOF10: 0
PAINLEVEL_OUTOF10: 3
PAINLEVEL_OUTOF10: 7

## 2018-05-17 NOTE — OP REPORT
DATE OF SERVICE:  05/17/2018    SURGEON:  Oleg Burns MD    ASSISTANT:  MIR Guadalupe    PREOPERATIVE DIAGNOSIS:  Chronic cholecystitis, status post   choledocholithiasis.    POSTOPERATIVE DIAGNOSIS:  Chronic cholecystitis, status post   choledocholithiasis.    PROCEDURE PERFORMED:  Laparoscopic cholecystectomy.    ANESTHESIA:  General endotracheal anesthesia.    ANESTHESIOLOGIST:  Roman Bautista MD    INDICATIONS:  A 63-year-old woman with symptomatic gallstones, status post an   episode of choledocholithiasis.  Cholecystectomy is indicated.    DESCRIPTION OF PROCEDURE:  Procedure was discussed in detail with the patient   including laparoscopic approach, potential for converting to an open   procedure, associated risk of bleeding, infection, abscess, and hematoma.  I   also discussed the risk of postoperative bile leak, common bile duct   stricture, common bile duct transection, and the significance of these   complications.  She understood all the above and wished to proceed.  She was   placed under anesthesia by Dr. Bautista.  Abdomen was prepped and draped with   chlorhexidine prep and sterile drapes.  After a time-out, an infraumbilical   incision was made and through this incision, peritoneal cavity was entered   using the open Seldinger technique.  Pneumoperitoneum was achieved with CO2   insufflation at a pressure of 15 mmHg.  Under direct visualization, a 12 mm   subxiphoid and two 5 mm subcostal ports were placed.  Gallbladder was   identified and retracted superiorly and laterally.  Base of gallbladder was   carefully dissected and the cystic duct was identified.  It could be seen to   clearly exit the gallbladder and retract laterally along with the gallbladder.    In similar fashion, cystic artery was identified and dissected away from   surrounding connective tissue.  Once a critical view had been obtained, 3   clips were placed proximally, 1 distally, and the duct was divided sharply    with scissors.  In similar fashion, cystic artery was clipped twice   proximally, once distally, and divided sharply with scissors.  Gallbladder was   then dissected off the liver bed and placed in a bag retrieval device.    Hemostasis assured with cautery.  Peritoneal cavity was irrigated copiously.    There was no evidence of any bleeding or bile leak.  Ports were removed.    Pneumoperitoneum was released.  The infraumbilical fascia was approximated   with 0 Vicryl stitches.  Subcutaneous tissue was irrigated and the skin was   approximated with 4-0 Vicryl stitches.  Dermabond was placed.  The patient   tolerated the procedure well without apparent complication.  Final counts were   reported as correct.       ____________________________________     MD ANDI HOSKINS / LESLIE    DD:  05/17/2018 10:36:58  DT:  05/17/2018 11:42:08    D#:  [id number]  Job#:  [id number]    cc: Verena HESTER

## 2018-05-17 NOTE — CARE PLAN
Problem: Safety  Goal: Will remain free from falls  Outcome: PROGRESSING AS EXPECTED  Pt educated on need to call before getting out of bed. Pt verbalizes understanding. Treaded socks on pt. Bed locked. Room safety check. Bed in lowest position. Pt calls for help appropriately on call light. Call light with in reach. Hourly rounding in place.        Problem: Knowledge Deficit  Goal: Knowledge of disease process/condition, treatment plan, diagnostic tests, and medications will improve  Outcome: PROGRESSING AS EXPECTED   Reviewed POC including safety, mobility, pain management, medication administration, DVT prophylaxis, NPO at 0000, surgery, and discharge.

## 2018-05-17 NOTE — PROGRESS NOTES
Report from Sarah in PACU. Patient returned to room. Family at bedside. Continuous pulse oximetry and SCDs in place. Patient denies pain or nausea at this time.

## 2018-05-17 NOTE — CONSULTS
DATE OF SERVICE:  05/16/2018    HISTORY OF PRESENT ILLNESS:  The patient is a 63-year-old woman, whom I have   been asked to evaluate further by Dr. Holguin for abdominal pain.  She gives a   history of right upper quadrant pain which began yesterday.  She was   transferred here for further care.  She was diagnosed with a common bile duct   stone, which has apparently passed.  She is not felt to be a candidate for   cholecystectomy.  At the time of my exam, she does state that her pain has   largely resolved.  She describes it as quite severe with radiation to the back   and associated with nausea.  She has not had any fevers, chills, sweats, or   acute weight changes.  She has not had any jaundice or acholic stools.    PAST MEDICAL HISTORY:  Significant for hypertension and asthma.    PAST SURGICAL HISTORY:  She has had a lithotripsy.    MEDICATIONS:  Prior to this admission include losartan and p.r.n. Naprosyn.    ALLERGIES:  She has no stated drug allergies.    SOCIAL HISTORY:  She does not smoke.  She drinks moderately.    FAMILY HISTORY:  Essentially negative.    REVIEW OF SYSTEMS:  Good health prior to this.  Negative per AMA and CMS   criteria with the exception of abdominal pain as per HPI.    PHYSICAL EXAMINATION:  VITAL SIGNS:  Here, she currently has a temperature of 36.6, pulse of 84,   blood pressure is 149/85.  GENERAL:  She is lying in bed, in no acute distress.  HEENT:  Unremarkable.  Her pupils are equal.  Oropharynx without lesions.  NECK:  Supple.  LUNGS:  Clear.  CARDIOVASCULAR:  Reveals regular rate and rhythm.  ABDOMEN:  Soft and nontender at this time.  EXTREMITIES:  Symmetrical, without clubbing, cyanosis or edema.  She has   palpable radial, femoral and pedal pulses.  SKIN:  Warm and dry.  NEUROLOGIC:  She is neurologically intact without any gross detectable   deficits in upper and lower extremities.  Her cranial nerves are intact.    LABORATORY DATA:  She has laboratory data which includes  a white count of 7.9,   hematocrit 42, platelets of 209.  Sodium was 140, potassium is 4.0, chloride   is 108, CO2 is 23, BUN is 18, creatinine 0.66.  Transaminases are normal with   an AST of 213, ALT of 238, alkaline phosphatase of 119, total bilirubin of   0.6.  She had an ultrasound at the outside facility, which did show what was   felt to be a common bile duct stone.  MRI of her abdomen done here did not   reveal any abnormality in her common bile duct.  Of note, she did not appear   to have any residual gallstones.  She was seen in consultation by the   gastroenterologist, who have recommended a cholecystectomy given her   presentation and history and certainly this is reasonable.    IMPRESSION:  A 63-year-old woman with a history of apparent passed common bile   duct stone and he is now a candidate for cholecystectomy.  I discussed this   in detail with her including laparoscopic approach, potential for converting   to an open procedure, associated risk of bleeding, infection, abscess, and   hematoma.  Also discussed risk of postoperative bile leak, common bile duct   stricture, common bile duct transection, and the significance of the   complications.  She understands all the above.  She does wish to proceed.  She   understands that she has options to have this done at a later date given the   fact that the MRI does not definitively show stones.  She, however, strongly   prefers to have it done during this admission.  Unfortunately, she did have a   mail of short while ago and we will have to schedule her for tomorrow.  We   will get her on scheduled for tomorrow morning.       ____________________________________     MD ANDI HOSKINS / LESLIE    DD:  05/16/2018 16:09:04  DT:  05/16/2018 20:13:24    D#:  [id number]  Job#:  [id number]

## 2018-05-17 NOTE — PROGRESS NOTES
[facility]    Date of Service: 2018    Chief Complaint  63 y.o. female admitted 5/15/2018 with abdominal pain secondary to choledocholithiasis seen on CT, MRCP is negative.    Interval Problem Update  Status post laparoscopic cholecystectomy  Reports minimal abdominal discomfort  Denies any nausea or vomiting  Positive constipation    Consultants/Specialty  Surgery  GI    Disposition  To home in 1-2 days with clinical improvement        Review of Systems   Constitutional: Negative for chills, diaphoresis, fever and malaise/fatigue.   HENT: Negative for congestion and hearing loss.    Respiratory: Negative for cough and shortness of breath.    Cardiovascular: Negative for chest pain, palpitations and leg swelling.   Gastrointestinal: Positive for abdominal pain and constipation. Negative for nausea and vomiting.   Genitourinary: Negative for dysuria and flank pain.   Musculoskeletal: Negative for back pain, joint pain and myalgias.   Neurological: Positive for weakness. Negative for dizziness, sensory change, speech change, focal weakness and headaches.   Psychiatric/Behavioral: Negative for depression and memory loss. The patient is not nervous/anxious.       Physical Exam  Laboratory/Imaging   Hemodynamics  Temp (24hrs), Av.7 °C (98.1 °F), Min:36.2 °C (97.2 °F), Max:37.4 °C (99.4 °F)   Temperature: 36.2 °C (97.2 °F)  Pulse  Av.8  Min: 55  Max: 94 Heart Rate (Monitored): 79  Blood Pressure: 112/61, NIBP: 114/70      Respiratory      Respiration: 16, Pulse Oximetry: 96 %             Fluids    Intake/Output Summary (Last 24 hours) at 18 1441  Last data filed at 18 1100   Gross per 24 hour   Intake             1220 ml   Output                5 ml   Net             1215 ml       Nutrition  Orders Placed This Encounter   Procedures   • DIET ORDER     Standing Status:   Standing     Number of Occurrences:   1     Order Specific Question:   Diet:     Answer:   Full Liquid  [11]     Physical Exam   Constitutional: She is oriented to person, place, and time. She appears well-nourished. No distress.   HENT:   Head: Normocephalic and atraumatic.   Nose: Nose normal.   Eyes: EOM are normal. Pupils are equal, round, and reactive to light. Right eye exhibits no discharge. Left eye exhibits no discharge. No scleral icterus.   Neck: Neck supple. No thyromegaly present.   Cardiovascular: Normal rate and intact distal pulses.    No murmur heard.  Pulmonary/Chest: Effort normal and breath sounds normal. No respiratory distress. She has no wheezes.   Abdominal: Soft. Bowel sounds are normal. She exhibits no distension. There is no tenderness.   Musculoskeletal: She exhibits tenderness. She exhibits no edema.   Neurological: She is alert and oriented to person, place, and time. No cranial nerve deficit. Coordination normal.   Skin: Skin is warm and dry. No rash noted. She is not diaphoretic. No erythema. No pallor.   Psychiatric: She has a normal mood and affect. Her behavior is normal. Judgment and thought content normal.   Nursing note and vitals reviewed.      Recent Labs      05/16/18   0119  05/17/18   0209   WBC  7.9  6.9   RBC  4.57  4.47   HEMOGLOBIN  13.9  14.0   HEMATOCRIT  42.0  41.4   MCV  91.9  92.6   MCH  30.4  31.3   MCHC  33.1*  33.8   RDW  45.7  46.0   PLATELETCT  209  186   MPV  11.0  10.7     Recent Labs      05/16/18   0119  05/17/18   0209   SODIUM  140  139   POTASSIUM  4.0  3.8   CHLORIDE  108  111   CO2  23  23   GLUCOSE  102*  93   BUN  18  14   CREATININE  0.66  0.54   CALCIUM  9.5  8.9                      Assessment/Plan     Calculus of bile duct without cholecystitis with obstruction- (present on admission)   Assessment & Plan    MRCP negative for gallstone  Status post laparoscopic cholecystectomy secondary to acute cholecystitis, postop day #0    GI , no indication for ERCP secondary to a negative MRCP    No evidence of infection at this time  Continue to monitor  CMP  Pain control with oxycodone and IV morphine           Essential hypertension- (present on admission)   Assessment & Plan    controlled  I will start the patient on losartan 25 mg daily  IV labetalol when necessary for SBP greater than 160        Constipation   Assessment & Plan    Add bowel protocol  Encourage ambulation        Mild intermittent asthma- (present on admission)   Assessment & Plan    Stable  Albuterol when necessary          Quality-Core Measures   Reviewed items::  Labs reviewed, Medications reviewed and Radiology images reviewed  DVT prophylaxis - mechanical:  SCDs  Ulcer Prophylaxis::  Not indicated  Assessed for rehabilitation services:  Patient returned to prior level of function, rehabilitation not indicated at this time

## 2018-05-17 NOTE — OR SURGEON
Immediate Post OP Note    PreOp Diagnosis: cholecystitis    PostOp Diagnosis: cholecystitis    Procedure(s):  GRETA BY LAPAROSCOPY - Wound Class: Clean Contaminated    Surgeon(s):  Oleg Burns M.D.    Anesthesiologist/Type of Anesthesia:  Anesthesiologist: Roman Bautista M.D./* No anesthesia type entered *    Surgical Staff:  Circulator: Nusrat Hernández R.N.  Scrub Person: Charlene Baumann    Specimens removed if any:  gallbladder    Estimated Blood Loss: 25 cc    Findings: adhesions    Complications: none        5/17/2018 10:33 AM Oleg Burns M.D.

## 2018-05-17 NOTE — OR NURSING
Report called to Ricky PARISI. Plan of care discussed. Patient dozing intermittently, easily arouses to voice. Denies pain at this time. No complaints of nausea. Ice pack in place. Patient states all needs are met.

## 2018-05-17 NOTE — OR NURSING
Report from Stevie RN. Patient resting in bed with eyes closed, even and unlabored respirations noted. No acute s/s of distress at this time.

## 2018-05-18 VITALS
WEIGHT: 205.25 LBS | TEMPERATURE: 97.8 F | HEIGHT: 67 IN | OXYGEN SATURATION: 96 % | HEART RATE: 89 BPM | RESPIRATION RATE: 20 BRPM | SYSTOLIC BLOOD PRESSURE: 138 MMHG | DIASTOLIC BLOOD PRESSURE: 89 MMHG | BODY MASS INDEX: 32.21 KG/M2

## 2018-05-18 LAB
ALBUMIN SERPL BCP-MCNC: 3.8 G/DL (ref 3.2–4.9)
ALBUMIN/GLOB SERPL: 1.5 G/DL
ALP SERPL-CCNC: 109 U/L (ref 30–99)
ALT SERPL-CCNC: 122 U/L (ref 2–50)
ANION GAP SERPL CALC-SCNC: 6 MMOL/L (ref 0–11.9)
ANION GAP SERPL CALC-SCNC: 8 MMOL/L (ref 0–11.9)
AST SERPL-CCNC: 50 U/L (ref 12–45)
BASOPHILS # BLD AUTO: 0.2 % (ref 0–1.8)
BASOPHILS # BLD: 0.02 K/UL (ref 0–0.12)
BILIRUB SERPL-MCNC: 0.5 MG/DL (ref 0.1–1.5)
BUN SERPL-MCNC: 12 MG/DL (ref 8–22)
BUN SERPL-MCNC: 9 MG/DL (ref 8–22)
CALCIUM SERPL-MCNC: 9.3 MG/DL (ref 8.5–10.5)
CALCIUM SERPL-MCNC: 9.5 MG/DL (ref 8.5–10.5)
CHLORIDE SERPL-SCNC: 107 MMOL/L (ref 96–112)
CHLORIDE SERPL-SCNC: 109 MMOL/L (ref 96–112)
CO2 SERPL-SCNC: 24 MMOL/L (ref 20–33)
CO2 SERPL-SCNC: 26 MMOL/L (ref 20–33)
CREAT SERPL-MCNC: 0.52 MG/DL (ref 0.5–1.4)
CREAT SERPL-MCNC: 0.63 MG/DL (ref 0.5–1.4)
EOSINOPHIL # BLD AUTO: 0.01 K/UL (ref 0–0.51)
EOSINOPHIL NFR BLD: 0.1 % (ref 0–6.9)
ERYTHROCYTE [DISTWIDTH] IN BLOOD BY AUTOMATED COUNT: 45.2 FL (ref 35.9–50)
GLOBULIN SER CALC-MCNC: 2.6 G/DL (ref 1.9–3.5)
GLUCOSE SERPL-MCNC: 113 MG/DL (ref 65–99)
GLUCOSE SERPL-MCNC: 116 MG/DL (ref 65–99)
HCT VFR BLD AUTO: 40.7 % (ref 37–47)
HGB BLD-MCNC: 13.2 G/DL (ref 12–16)
IMM GRANULOCYTES # BLD AUTO: 0.05 K/UL (ref 0–0.11)
IMM GRANULOCYTES NFR BLD AUTO: 0.5 % (ref 0–0.9)
LYMPHOCYTES # BLD AUTO: 1.44 K/UL (ref 1–4.8)
LYMPHOCYTES NFR BLD: 14.4 % (ref 22–41)
MCH RBC QN AUTO: 30.3 PG (ref 27–33)
MCHC RBC AUTO-ENTMCNC: 32.4 G/DL (ref 33.6–35)
MCV RBC AUTO: 93.3 FL (ref 81.4–97.8)
MONOCYTES # BLD AUTO: 0.59 K/UL (ref 0–0.85)
MONOCYTES NFR BLD AUTO: 5.9 % (ref 0–13.4)
NEUTROPHILS # BLD AUTO: 7.88 K/UL (ref 2–7.15)
NEUTROPHILS NFR BLD: 78.9 % (ref 44–72)
NRBC # BLD AUTO: 0 K/UL
NRBC BLD-RTO: 0 /100 WBC
PLATELET # BLD AUTO: 193 K/UL (ref 164–446)
PMV BLD AUTO: 10.6 FL (ref 9–12.9)
POTASSIUM SERPL-SCNC: 3.8 MMOL/L (ref 3.6–5.5)
POTASSIUM SERPL-SCNC: 4.2 MMOL/L (ref 3.6–5.5)
PROT SERPL-MCNC: 6.4 G/DL (ref 6–8.2)
RBC # BLD AUTO: 4.36 M/UL (ref 4.2–5.4)
SODIUM SERPL-SCNC: 139 MMOL/L (ref 135–145)
SODIUM SERPL-SCNC: 141 MMOL/L (ref 135–145)
WBC # BLD AUTO: 10 K/UL (ref 4.8–10.8)

## 2018-05-18 PROCEDURE — 700102 HCHG RX REV CODE 250 W/ 637 OVERRIDE(OP): Performed by: INTERNAL MEDICINE

## 2018-05-18 PROCEDURE — 99239 HOSP IP/OBS DSCHRG MGMT >30: CPT | Performed by: INTERNAL MEDICINE

## 2018-05-18 PROCEDURE — 80053 COMPREHEN METABOLIC PANEL: CPT

## 2018-05-18 PROCEDURE — A9270 NON-COVERED ITEM OR SERVICE: HCPCS | Performed by: INTERNAL MEDICINE

## 2018-05-18 PROCEDURE — 80048 BASIC METABOLIC PNL TOTAL CA: CPT

## 2018-05-18 PROCEDURE — 85025 COMPLETE CBC W/AUTO DIFF WBC: CPT

## 2018-05-18 PROCEDURE — 36415 COLL VENOUS BLD VENIPUNCTURE: CPT

## 2018-05-18 RX ORDER — OXYCODONE HYDROCHLORIDE 5 MG/1
5 TABLET ORAL EVERY 6 HOURS PRN
Qty: 12 TAB | Refills: 0 | Status: SHIPPED | OUTPATIENT
Start: 2018-05-18 | End: 2018-05-21

## 2018-05-18 RX ORDER — LOSARTAN POTASSIUM 25 MG/1
25 TABLET ORAL DAILY
Qty: 30 TAB | Refills: 2 | Status: SHIPPED | OUTPATIENT
Start: 2018-05-18

## 2018-05-18 RX ORDER — ONDANSETRON 4 MG/1
4 TABLET, ORALLY DISINTEGRATING ORAL EVERY 4 HOURS PRN
Qty: 10 TAB | Refills: 0 | Status: SHIPPED | OUTPATIENT
Start: 2018-05-18

## 2018-05-18 RX ADMIN — STANDARDIZED SENNA CONCENTRATE AND DOCUSATE SODIUM 2 TABLET: 8.6; 5 TABLET, FILM COATED ORAL at 09:55

## 2018-05-18 RX ADMIN — OXYCODONE HYDROCHLORIDE 10 MG: 10 TABLET ORAL at 11:15

## 2018-05-18 RX ADMIN — LOSARTAN POTASSIUM 25 MG: 25 TABLET, FILM COATED ORAL at 09:50

## 2018-05-18 ASSESSMENT — PAIN SCALES - GENERAL
PAINLEVEL_OUTOF10: 2
PAINLEVEL_OUTOF10: 2

## 2018-05-18 ASSESSMENT — PATIENT HEALTH QUESTIONNAIRE - PHQ9
SUM OF ALL RESPONSES TO PHQ9 QUESTIONS 1 AND 2: 0
2. FEELING DOWN, DEPRESSED, IRRITABLE, OR HOPELESS: NOT AT ALL
1. LITTLE INTEREST OR PLEASURE IN DOING THINGS: NOT AT ALL

## 2018-05-18 NOTE — DISCHARGE INSTRUCTIONS
Discharge Instructions    Discharged to home by car with relative. Discharged via wheelchair, hospital escort: Yes.  Special equipment needed: Not Applicable    Be sure to schedule a follow-up appointment with your primary care doctor or any specialists as instructed.     Discharge Plan:   Influenza Vaccine Indication: Not indicated: Previously immunized this influenza season and > 8 years of age    I understand that a diet low in cholesterol, fat, and sodium is recommended for good health. Unless I have been given specific instructions below for another diet, I accept this instruction as my diet prescription.   Other diet: Diet as tolerated    Special Instructions: None    · Is patient discharged on Warfarin / Coumadin?   No     Depression / Suicide Risk    As you are discharged from this [facility] facility, it is important to learn how to keep safe from harming yourself.    Recognize the warning signs:  · Abrupt changes in personality, positive or negative- including increase in energy   · Giving away possessions  · Change in eating patterns- significant weight changes-  positive or negative  · Change in sleeping patterns- unable to sleep or sleeping all the time   · Unwillingness or inability to communicate  · Depression  · Unusual sadness, discouragement and loneliness  · Talk of wanting to die  · Neglect of personal appearance   · Rebelliousness- reckless behavior  · Withdrawal from people/activities they love  · Confusion- inability to concentrate     If you or a loved one observes any of these behaviors or has concerns about self-harm, here's what you can do:  · Talk about it- your feelings and reasons for harming yourself  · Remove any means that you might use to hurt yourself (examples: pills, rope, extension cords, firearm)  · Get professional help from the community (Mental Health, Substance Abuse, psychological counseling)  · Do not be alone:Call your Safe Contact- someone whom you trust who will be  there for you.  · Call your local CRISIS HOTLINE 183-9398 or 890-122-0329  · Call your local Children's Mobile Crisis Response Team [place] (138) 437-4062 or www.Comic Reply  · Call the toll free National Suicide Prevention Hotlines   · National Suicide Prevention Lifeline 812-789-FFYZ (7193)  · [facility] Line Network 800-SUICIDE (142-5349)      Laparoscopic Cholecystectomy, Care After  Refer to this sheet in the next few weeks. These instructions provide you with information on caring for yourself after your procedure. Your health care provider may also give you more specific instructions. Your treatment has been planned according to current medical practices, but problems sometimes occur. Call your health care provider if you have any problems or questions after your procedure.  WHAT TO EXPECT AFTER THE PROCEDURE  After your procedure, it is typical to have the following:  · Pain at your incision sites. You will be given pain medicines to control the pain.  · Mild nausea or vomiting. This should improve after the first 24 hours.  · Bloating and possibly shoulder pain from the gas used during the procedure. This will improve after the first 24 hours.  HOME CARE INSTRUCTIONS   · Change bandages (dressings) as directed by your health care provider.  · Keep the wound dry and clean. You may wash the wound gently with soap and water. Gently blot or dab the area dry.  · Do not take baths or use swimming pools or hot tubs for 2 weeks or until your health care provider approves.  · Only take over-the-counter or prescription medicines as directed by your health care provider.  · Continue your normal diet as directed by your health care provider.  · Do not lift anything heavier than 10 pounds (4.5 kg) until your health care provider approves.  · Do not play contact sports for 1 week or until your health care provider approves.  SEEK MEDICAL CARE IF:   · You have redness, swelling, or increasing pain in the  wound.  · You notice yellowish-white fluid (pus) coming from the wound.  · You have drainage from the wound that lasts longer than 1 day.  · You notice a bad smell coming from the wound or dressing.  · Your surgical cuts (incisions) break open.  SEEK IMMEDIATE MEDICAL CARE IF:   · You develop a rash.  · You have difficulty breathing.  · You have chest pain.  · You have a fever.  · You have increasing pain in the shoulders (shoulder strap areas).  · You have dizzy episodes or faint while standing.  · You have severe abdominal pain.  · You feel sick to your stomach (nauseous) or throw up (vomit) and this lasts for more than 1 day.     This information is not intended to replace advice given to you by your health care provider. Make sure you discuss any questions you have with your health care provider.     Document Released: 12/18/2006 Document Revised: 10/08/2014 Document Reviewed: 07/30/2014  [employer] Interactive Patient Education ©2016 [employer]

## 2018-05-18 NOTE — DISCHARGE SUMMARY
CHIEF COMPLAINT ON ADMISSION  Chief Complaint   Patient presents with   • Flank Pain     R side   • Gallstones     transfer from [facility] in [place], [place].        CODE STATUS  Prior    HPI & HOSPITAL COURSE  This is a 63 y.o. female here with abdominal pain secondary to choledocholithiasis seen on CT, MRCP is negative. She was seen by GI during this admission and did not require ERCP. Surgery was consulted for associated gallstone with possible cholecystitis. She did complete a laparoscopic cholecystectomy during this admission with resolution of pain. She is tolerating an oral diet with minimal residual pain. Transaminitis is improving. She has been cleared by surgery for discharge to home.    The patient met 2-midnight criteria for an inpatient stay at the time of discharge.    Therefore, she is discharged in good and stable condition with close outpatient follow-up.    SPECIFIC OUTPATIENT FOLLOW-UP  Primary care provider/discharge clinic in one week  Surgery is scheduled    DISCHARGE PROBLEM LIST  Active Problems:    Calculus of bile duct without cholecystitis with obstruction POA: Yes    Essential hypertension POA: Yes    Mild intermittent asthma POA: Yes    Constipation POA: Unknown  Resolved Problems:    * No resolved hospital problems. *      FOLLOW UP  No future appointments.  Oleg Burns M.D.  [address]  [place] [address]  [phone]    In 2 weeks      Pcp Pt States None            MEDICATIONS ON DISCHARGE   Shola Ahmadi   Home Medication Instructions RICK:[id number]    Printed on:05/18/18 3557   Medication Information                      losartan (COZAAR) 25 MG Tab  Take 1 Tab by mouth every day.             naproxen (ALEVE) 220 MG tablet  Take 220 mg by mouth 2 times a day as needed (pain).             ondansetron (ZOFRAN ODT) 4 MG TABLET DISPERSIBLE  Take 1 Tab by mouth every four hours as needed (give PO if no IV route available).             oxyCODONE  immediate-release (ROXICODONE) 5 MG Tab  Take 1 Tab by mouth every 6 hours as needed for up to 3 days.                 DIET  No orders of the defined types were placed in this encounter.      ACTIVITY  As tolerated.  Weight bearing as tolerated      CONSULTATIONS  GI  Surgery    PROCEDURES  Laparoscopic cholecystectomy    LABORATORY  Lab Results   Component Value Date/Time    SODIUM 141 05/18/2018 09:04 AM    POTASSIUM 3.8 05/18/2018 09:04 AM    CHLORIDE 107 05/18/2018 09:04 AM    CO2 26 05/18/2018 09:04 AM    GLUCOSE 116 (H) 05/18/2018 09:04 AM    BUN 12 05/18/2018 09:04 AM    CREATININE 0.63 05/18/2018 09:04 AM        Lab Results   Component Value Date/Time    WBC 10.0 05/18/2018 01:53 AM    HEMOGLOBIN 13.2 05/18/2018 01:53 AM    HEMATOCRIT 40.7 05/18/2018 01:53 AM    PLATELETCT 193 05/18/2018 01:53 AM        Total time of the discharge process exceeds 45 minutes

## 2018-05-18 NOTE — PROGRESS NOTES
Blood Pressure: 108/70, NIBP: 114/70, NIBP MAP (Calculated): 82, Heart Rate (Monitored): 79, Pulse: 96, Respiration: 17, Temperature: 36.1 °C (97 °F), Pulse Oximetry: 94 %, O2 (LPM): 2, O2 Delivery: Nasal Cannula    S/P lap madalyn this morning    No N/V  Tolerating full liquids  Ambulating  Voiding  Adequate pain control    Abd soft  Minimal expected tenderness  Scant blood from umbilical port site after walking  A&O x 4  Resp rate even and unlabored  Supplemental oxygen - 2L NC    IS to bedside and educated by APRN - IS 1250cc  Discussed importance with patient,  and bedside RN - Ricky  Mobilize and wean oxygen    No further surgical recommendations  Further care per hospitalist expertise    May shower  Recommend follow up in 10-14 days with Dr. Burns or PCP in [place] area    Thank you

## 2018-05-18 NOTE — CARE PLAN
Problem: Pain Management  Goal: Pain level will decrease to patient's comfort goal    Intervention: Educate and implement non-pharmacologic comfort measures. Examples: relaxation, distration, play therapy, activity therapy, massage, etc.  Pt ambulates in hallway regularly to help with discomfort, has not needed PRN pain meds so far this shift.       Problem: Fluid Volume:  Goal: Will maintain balanced intake and output  Pt is eating and drinking regularly, able to tolerate regular diet. Denies n/v.

## 2018-05-20 LAB
O+P SPEC MICRO: NORMAL
SIGNIFICANT IND 70042: NORMAL
SITE SITE: NORMAL
SOURCE SOURCE: NORMAL

## 2018-05-22 NOTE — ADDENDUM NOTE
Encounter addended by: Melodie Dexter R.N. on: 5/22/2018  1:27 PM<BR>    Actions taken: Flowsheet accepted

## 2018-05-23 LAB — EKG IMPRESSION: NORMAL

## (undated) DEVICE — PROTECTOR ULNA NERVE - (36PR/CA)

## (undated) DEVICE — TROCAR Z THREAD12MM OPTICAL - NON BLADED (6/BX)

## (undated) DEVICE — GOWN WARMING STANDARD FLEX - (30/CA)

## (undated) DEVICE — BAG RETRIEVAL 10ML (10EA/BX)

## (undated) DEVICE — SCISSORS 5MM CVD (6EA/BX)

## (undated) DEVICE — TUBING CLEARLINK DUO-VENT - C-FLO (48EA/CA)

## (undated) DEVICE — KIT ROOM DECONTAMINATION

## (undated) DEVICE — HEAD HOLDER JUNIOR/ADULT

## (undated) DEVICE — TUBE CONNECT SUCTION CLEAR 120 X 1/4" (50EA/CA)"

## (undated) DEVICE — SUTURE 4-0 VICRYL PLUS FS-2 - 27 INCH (36/BX)

## (undated) DEVICE — NEPTUNE 4 PORT MANIFOLD - (20/PK)

## (undated) DEVICE — CANNULA W/SEAL 5X100 Z-THRE - ADED KII (12/BX)

## (undated) DEVICE — SUTURE 0 VICRYL PLUS CT-2 - 27 INCH (36/BX)

## (undated) DEVICE — TROCAR 5X100 BLADED Z-THREAD - KII (6/BX)

## (undated) DEVICE — CANISTER SUCTION 3000ML MECHANICAL FILTER AUTO SHUTOFF MEDI-VAC NONSTERILE LF DISP  (40EA/CA)

## (undated) DEVICE — SENSOR SPO2 NEO LNCS ADHESIVE (20/BX) SEE USER NOTES

## (undated) DEVICE — DERMABOND ADVANCED - (12EA/BX)

## (undated) DEVICE — WATER IRRIG. STER. 1500 ML - (9/CA)

## (undated) DEVICE — SODIUM CHL IRRIGATION 0.9% 1000ML (12EA/CA)

## (undated) DEVICE — GLOVE BIOGEL SZ 8 SURGICAL PF LTX - (50PR/BX 4BX/CA)

## (undated) DEVICE — SET LEADWIRE 5 LEAD BEDSIDE DISPOSABLE ECG (1SET OF 5/EA)

## (undated) DEVICE — PACK LAP CHOLE OR - (2EA/CA)

## (undated) DEVICE — DETERGENT RENUZYME PLUS 10 OZ PACKET (50/BX)

## (undated) DEVICE — SUTURE GENERAL

## (undated) DEVICE — SUTURE 0 COATED VICRYL 6-18IN - (12PK/BX)

## (undated) DEVICE — ELECTRODE DUAL RETURN W/ CORD - (50/PK)

## (undated) DEVICE — LACTATED RINGERS INJ 1000 ML - (14EA/CA 60CA/PF)

## (undated) DEVICE — MASK ANESTHESIA ADULT  - (100/CA)

## (undated) DEVICE — TROCAR LAPSCP 100MM 12MM NTHRD - (6/BX)

## (undated) DEVICE — CHLORAPREP 26 ML APPLICATOR - ORANGE TINT(25/CA)

## (undated) DEVICE — CLIP MED LG INTNL HRZN TI ESCP - (20/BX)

## (undated) DEVICE — KIT ANESTHESIA W/CIRCUIT & 3/LT BAG W/FILTER (20EA/CA)

## (undated) DEVICE — TUBING INSUFFLATION - (10/BX)

## (undated) DEVICE — SET EXTENSION WITH 2 PORTS (48EA/CA) ***PART #2C8610 IS A SUBSTITUTE*****

## (undated) DEVICE — SUCTION INSTRUMENT YANKAUER BULBOUS TIP W/O VENT (50EA/CA)

## (undated) DEVICE — TUBE E-T HI-LO CUFF 7.0MM (10EA/PK)

## (undated) DEVICE — ELECTRODE 850 FOAM ADHESIVE - HYDROGEL RADIOTRNSPRNT (50/PK)